# Patient Record
Sex: FEMALE | Race: WHITE | NOT HISPANIC OR LATINO | Employment: FULL TIME | URBAN - METROPOLITAN AREA
[De-identification: names, ages, dates, MRNs, and addresses within clinical notes are randomized per-mention and may not be internally consistent; named-entity substitution may affect disease eponyms.]

---

## 2023-04-04 LAB
ABO GROUP (TYPE) IN BLOOD: NORMAL
ANTIBODY SCREEN: NORMAL
ERYTHROCYTE DISTRIBUTION WIDTH (RATIO) BY AUTOMATED COUNT: 13.2 % (ref 11.5–14.5)
ERYTHROCYTE MEAN CORPUSCULAR HEMOGLOBIN CONCENTRATION (G/DL) BY AUTOMATED: 32.6 G/DL (ref 32–36)
ERYTHROCYTE MEAN CORPUSCULAR VOLUME (FL) BY AUTOMATED COUNT: 87 FL (ref 80–100)
ERYTHROCYTES (10*6/UL) IN BLOOD BY AUTOMATED COUNT: 4.19 X10E12/L (ref 4–5.2)
ESTIMATED AVERAGE GLUCOSE FOR HBA1C: 85 MG/DL
HEMATOCRIT (%) IN BLOOD BY AUTOMATED COUNT: 36.5 % (ref 36–46)
HEMOGLOBIN (G/DL) IN BLOOD: 11.9 G/DL (ref 12–16)
HEMOGLOBIN A1C/HEMOGLOBIN TOTAL IN BLOOD: 4.6 %
HEPATITIS B VIRUS SURFACE AG PRESENCE IN SERUM: NONREACTIVE
HEPATITIS C VIRUS AB PRESENCE IN SERUM: NONREACTIVE
HIV 1/ 2 AG/AB SCREEN: NONREACTIVE
LEUKOCYTES (10*3/UL) IN BLOOD BY AUTOMATED COUNT: 9.9 X10E9/L (ref 4.4–11.3)
PLATELETS (10*3/UL) IN BLOOD AUTOMATED COUNT: 220 X10E9/L (ref 150–450)
REFLEX ADDED, ANEMIA PANEL: ABNORMAL
RH FACTOR: NORMAL
RUBELLA VIRUS IGG AB: POSITIVE
SYPHILIS TOTAL AB: NONREACTIVE

## 2023-04-05 LAB
CHLAMYDIA TRACH., AMPLIFIED: NEGATIVE
N. GONORRHEA, AMPLIFIED: NEGATIVE

## 2023-04-06 LAB — URINE CULTURE: NORMAL

## 2023-07-26 LAB
ERYTHROCYTE DISTRIBUTION WIDTH (RATIO) BY AUTOMATED COUNT: 14.6 % (ref 11.5–14.5)
ERYTHROCYTE MEAN CORPUSCULAR HEMOGLOBIN CONCENTRATION (G/DL) BY AUTOMATED: 32.2 G/DL (ref 32–36)
ERYTHROCYTE MEAN CORPUSCULAR VOLUME (FL) BY AUTOMATED COUNT: 87 FL (ref 80–100)
ERYTHROCYTES (10*6/UL) IN BLOOD BY AUTOMATED COUNT: 4.13 X10E12/L (ref 4–5.2)
GLUCOSE, 1 HR SCREEN, PREG: 114 MG/DL
HEMATOCRIT (%) IN BLOOD BY AUTOMATED COUNT: 36 % (ref 36–46)
HEMOGLOBIN (G/DL) IN BLOOD: 11.6 G/DL (ref 12–16)
LEUKOCYTES (10*3/UL) IN BLOOD BY AUTOMATED COUNT: 7.1 X10E9/L (ref 4.4–11.3)
PLATELETS (10*3/UL) IN BLOOD AUTOMATED COUNT: 189 X10E9/L (ref 150–450)
REFLEX ADDED, ANEMIA PANEL: ABNORMAL

## 2023-09-15 LAB — GROUP B STREP SCREEN: NORMAL

## 2023-10-10 ENCOUNTER — PREP FOR PROCEDURE (OUTPATIENT)
Dept: OBSTETRICS AND GYNECOLOGY | Facility: HOSPITAL | Age: 33
End: 2023-10-10
Payer: COMMERCIAL

## 2023-10-17 ENCOUNTER — APPOINTMENT (OUTPATIENT)
Dept: OBSTETRICS AND GYNECOLOGY | Facility: HOSPITAL | Age: 33
End: 2023-10-17
Payer: COMMERCIAL

## 2023-10-17 ENCOUNTER — HOSPITAL ENCOUNTER (INPATIENT)
Facility: HOSPITAL | Age: 33
LOS: 2 days | Discharge: HOME | End: 2023-10-19
Attending: OBSTETRICS & GYNECOLOGY | Admitting: OBSTETRICS & GYNECOLOGY
Payer: COMMERCIAL

## 2023-10-17 PROBLEM — Z34.90 TERM PREGNANCY (HHS-HCC): Status: ACTIVE | Noted: 2023-10-17

## 2023-10-17 PROBLEM — E28.2 POLYCYSTIC OVARIAN SYNDROME: Chronic | Status: ACTIVE | Noted: 2023-10-17

## 2023-10-17 LAB
ABO GROUP (TYPE) IN BLOOD: NORMAL
ANTIBODY SCREEN: NORMAL
ERYTHROCYTE [DISTWIDTH] IN BLOOD BY AUTOMATED COUNT: 14.5 % (ref 11.5–14.5)
HCT VFR BLD AUTO: 36.6 % (ref 36–46)
HGB BLD-MCNC: 12.2 G/DL (ref 12–16)
MCH RBC QN AUTO: 28.8 PG (ref 26–34)
MCHC RBC AUTO-ENTMCNC: 33.3 G/DL (ref 32–36)
MCV RBC AUTO: 87 FL (ref 80–100)
NRBC BLD-RTO: 0 /100 WBCS (ref 0–0)
PLATELET # BLD AUTO: 167 X10*3/UL (ref 150–450)
PMV BLD AUTO: 13.2 FL (ref 7.5–11.5)
RBC # BLD AUTO: 4.23 X10*6/UL (ref 4–5.2)
RH FACTOR (ANTIGEN D): NORMAL
WBC # BLD AUTO: 10.8 X10*3/UL (ref 4.4–11.3)

## 2023-10-17 PROCEDURE — 36415 COLL VENOUS BLD VENIPUNCTURE: CPT | Performed by: OBSTETRICS & GYNECOLOGY

## 2023-10-17 PROCEDURE — 86900 BLOOD TYPING SEROLOGIC ABO: CPT | Performed by: OBSTETRICS & GYNECOLOGY

## 2023-10-17 PROCEDURE — 2500000001 HC RX 250 WO HCPCS SELF ADMINISTERED DRUGS (ALT 637 FOR MEDICARE OP): Performed by: OBSTETRICS & GYNECOLOGY

## 2023-10-17 PROCEDURE — 1120000001 HC OB PRIVATE ROOM DAILY

## 2023-10-17 PROCEDURE — 86780 TREPONEMA PALLIDUM: CPT | Mod: CMCLAB,GEALAB | Performed by: OBSTETRICS & GYNECOLOGY

## 2023-10-17 PROCEDURE — 85027 COMPLETE CBC AUTOMATED: CPT | Performed by: OBSTETRICS & GYNECOLOGY

## 2023-10-17 RX ORDER — SODIUM CHLORIDE, SODIUM LACTATE, POTASSIUM CHLORIDE, CALCIUM CHLORIDE 600; 310; 30; 20 MG/100ML; MG/100ML; MG/100ML; MG/100ML
125 INJECTION, SOLUTION INTRAVENOUS CONTINUOUS
Status: DISCONTINUED | OUTPATIENT
Start: 2023-10-17 | End: 2023-10-18

## 2023-10-17 RX ORDER — METOCLOPRAMIDE HYDROCHLORIDE 5 MG/ML
10 INJECTION INTRAMUSCULAR; INTRAVENOUS EVERY 6 HOURS PRN
Status: DISCONTINUED | OUTPATIENT
Start: 2023-10-17 | End: 2023-10-18

## 2023-10-17 RX ORDER — METOCLOPRAMIDE 10 MG/1
10 TABLET ORAL EVERY 6 HOURS PRN
Status: DISCONTINUED | OUTPATIENT
Start: 2023-10-17 | End: 2023-10-18

## 2023-10-17 RX ORDER — TRANEXAMIC ACID 100 MG/ML
1000 INJECTION, SOLUTION INTRAVENOUS ONCE AS NEEDED
Status: DISCONTINUED | OUTPATIENT
Start: 2023-10-17 | End: 2023-10-18

## 2023-10-17 RX ORDER — LOPERAMIDE HYDROCHLORIDE 2 MG/1
4 CAPSULE ORAL EVERY 2 HOUR PRN
Status: DISCONTINUED | OUTPATIENT
Start: 2023-10-17 | End: 2023-10-18

## 2023-10-17 RX ORDER — OXYTOCIN 10 [USP'U]/ML
10 INJECTION, SOLUTION INTRAMUSCULAR; INTRAVENOUS ONCE AS NEEDED
Status: DISCONTINUED | OUTPATIENT
Start: 2023-10-17 | End: 2023-10-18

## 2023-10-17 RX ORDER — CARBOPROST TROMETHAMINE 250 UG/ML
250 INJECTION, SOLUTION INTRAMUSCULAR ONCE AS NEEDED
Status: DISCONTINUED | OUTPATIENT
Start: 2023-10-17 | End: 2023-10-18

## 2023-10-17 RX ORDER — LIDOCAINE HYDROCHLORIDE 10 MG/ML
30 INJECTION INFILTRATION; PERINEURAL ONCE AS NEEDED
Status: DISCONTINUED | OUTPATIENT
Start: 2023-10-17 | End: 2023-10-18

## 2023-10-17 RX ORDER — HYDRALAZINE HYDROCHLORIDE 20 MG/ML
5 INJECTION INTRAMUSCULAR; INTRAVENOUS ONCE AS NEEDED
Status: DISCONTINUED | OUTPATIENT
Start: 2023-10-17 | End: 2023-10-18

## 2023-10-17 RX ORDER — OXYTOCIN/0.9 % SODIUM CHLORIDE 30/500 ML
60 PLASTIC BAG, INJECTION (ML) INTRAVENOUS ONCE AS NEEDED
Status: DISCONTINUED | OUTPATIENT
Start: 2023-10-17 | End: 2023-10-18

## 2023-10-17 RX ORDER — TERBUTALINE SULFATE 1 MG/ML
0.25 INJECTION SUBCUTANEOUS ONCE AS NEEDED
Status: DISCONTINUED | OUTPATIENT
Start: 2023-10-17 | End: 2023-10-18

## 2023-10-17 RX ORDER — OXYTOCIN/0.9 % SODIUM CHLORIDE 30/500 ML
2-30 PLASTIC BAG, INJECTION (ML) INTRAVENOUS CONTINUOUS
Status: DISCONTINUED | OUTPATIENT
Start: 2023-10-17 | End: 2023-10-18

## 2023-10-17 RX ORDER — NIFEDIPINE 10 MG/1
10 CAPSULE ORAL ONCE AS NEEDED
Status: DISCONTINUED | OUTPATIENT
Start: 2023-10-17 | End: 2023-10-18

## 2023-10-17 RX ORDER — MISOPROSTOL 200 UG/1
800 TABLET ORAL ONCE AS NEEDED
Status: DISCONTINUED | OUTPATIENT
Start: 2023-10-17 | End: 2023-10-18

## 2023-10-17 RX ORDER — LABETALOL HYDROCHLORIDE 5 MG/ML
20 INJECTION, SOLUTION INTRAVENOUS ONCE AS NEEDED
Status: DISCONTINUED | OUTPATIENT
Start: 2023-10-17 | End: 2023-10-18

## 2023-10-17 RX ORDER — ONDANSETRON 4 MG/1
4 TABLET, FILM COATED ORAL EVERY 6 HOURS PRN
Status: DISCONTINUED | OUTPATIENT
Start: 2023-10-17 | End: 2023-10-18

## 2023-10-17 RX ORDER — ONDANSETRON HYDROCHLORIDE 2 MG/ML
4 INJECTION, SOLUTION INTRAVENOUS EVERY 6 HOURS PRN
Status: DISCONTINUED | OUTPATIENT
Start: 2023-10-17 | End: 2023-10-18

## 2023-10-17 RX ORDER — METHYLERGONOVINE MALEATE 0.2 MG/ML
0.2 INJECTION INTRAVENOUS ONCE AS NEEDED
Status: DISCONTINUED | OUTPATIENT
Start: 2023-10-17 | End: 2023-10-18

## 2023-10-17 RX ADMIN — MISOPROSTOL 25 MCG: 100 TABLET ORAL at 23:03

## 2023-10-17 SDOH — HEALTH STABILITY: MENTAL HEALTH: WISH TO BE DEAD (PAST 1 MONTH): NO

## 2023-10-17 SDOH — HEALTH STABILITY: MENTAL HEALTH: NON-SPECIFIC ACTIVE SUICIDAL THOUGHTS (PAST 1 MONTH): NO

## 2023-10-17 SDOH — SOCIAL STABILITY: SOCIAL INSECURITY: DO YOU FEEL ANYONE HAS EXPLOITED OR TAKEN ADVANTAGE OF YOU FINANCIALLY OR OF YOUR PERSONAL PROPERTY?: NO

## 2023-10-17 SDOH — HEALTH STABILITY: MENTAL HEALTH: WERE YOU ABLE TO COMPLETE ALL THE BEHAVIORAL HEALTH SCREENINGS?: YES

## 2023-10-17 SDOH — SOCIAL STABILITY: SOCIAL INSECURITY: HAVE YOU HAD THOUGHTS OF HARMING ANYONE ELSE?: NO

## 2023-10-17 SDOH — HEALTH STABILITY: MENTAL HEALTH: SUICIDAL BEHAVIOR (LIFETIME): NO

## 2023-10-17 SDOH — HEALTH STABILITY: MENTAL HEALTH: STRENGTHS (MUST CHOOSE TWO): SUPPORT FROM FAMILY;INDEPENDENT LIVING

## 2023-10-17 SDOH — ECONOMIC STABILITY: HOUSING INSECURITY: DO YOU FEEL UNSAFE GOING BACK TO THE PLACE WHERE YOU ARE LIVING?: NO

## 2023-10-17 SDOH — SOCIAL STABILITY: SOCIAL INSECURITY: DOES ANYONE TRY TO KEEP YOU FROM HAVING/CONTACTING OTHER FRIENDS OR DOING THINGS OUTSIDE YOUR HOME?: NO

## 2023-10-17 SDOH — SOCIAL STABILITY: SOCIAL INSECURITY: ABUSE SCREEN: ADULT

## 2023-10-17 SDOH — SOCIAL STABILITY: SOCIAL INSECURITY: HAS ANYONE EVER THREATENED TO HURT YOUR FAMILY OR YOUR PETS?: NO

## 2023-10-17 SDOH — SOCIAL STABILITY: SOCIAL INSECURITY: ARE YOU OR HAVE YOU BEEN THREATENED OR ABUSED PHYSICALLY, EMOTIONALLY, OR SEXUALLY BY ANYONE?: NO

## 2023-10-17 SDOH — SOCIAL STABILITY: SOCIAL INSECURITY: PHYSICAL ABUSE: DENIES

## 2023-10-17 SDOH — SOCIAL STABILITY: SOCIAL INSECURITY: VERBAL ABUSE: DENIES

## 2023-10-17 SDOH — SOCIAL STABILITY: SOCIAL INSECURITY: ARE THERE ANY APPARENT SIGNS OF INJURIES/BEHAVIORS THAT COULD BE RELATED TO ABUSE/NEGLECT?: NO

## 2023-10-17 ASSESSMENT — PAIN SCALES - GENERAL
PAINLEVEL_OUTOF10: 0 - NO PAIN
PAINLEVEL_OUTOF10: 0 - NO PAIN

## 2023-10-17 ASSESSMENT — LIFESTYLE VARIABLES
HOW OFTEN DO YOU HAVE A DRINK CONTAINING ALCOHOL: NEVER
HOW MANY STANDARD DRINKS CONTAINING ALCOHOL DO YOU HAVE ON A TYPICAL DAY: PATIENT DOES NOT DRINK
HOW OFTEN DO YOU HAVE 6 OR MORE DRINKS ON ONE OCCASION: NEVER
SKIP TO QUESTIONS 9-10: 1
AUDIT-C TOTAL SCORE: 0
AUDIT-C TOTAL SCORE: 0

## 2023-10-17 ASSESSMENT — ACTIVITIES OF DAILY LIVING (ADL): LACK_OF_TRANSPORTATION: NO

## 2023-10-17 ASSESSMENT — PATIENT HEALTH QUESTIONNAIRE - PHQ9
SUM OF ALL RESPONSES TO PHQ9 QUESTIONS 1 & 2: 0
2. FEELING DOWN, DEPRESSED OR HOPELESS: NOT AT ALL
1. LITTLE INTEREST OR PLEASURE IN DOING THINGS: NOT AT ALL

## 2023-10-18 ENCOUNTER — ANESTHESIA (OUTPATIENT)
Dept: ANESTHESIOLOGY | Facility: HOSPITAL | Age: 33
End: 2023-10-18
Payer: COMMERCIAL

## 2023-10-18 ENCOUNTER — ANESTHESIA EVENT (OUTPATIENT)
Dept: ANESTHESIOLOGY | Facility: HOSPITAL | Age: 33
End: 2023-10-18
Payer: COMMERCIAL

## 2023-10-18 PROBLEM — Z34.90 TERM PREGNANCY (HHS-HCC): Status: RESOLVED | Noted: 2023-10-17 | Resolved: 2023-10-18

## 2023-10-18 LAB — T PALLIDUM AB SER QL: NONREACTIVE

## 2023-10-18 PROCEDURE — 3700000001 HC GENERAL ANESTHESIA TIME - INITIAL BASE CHARGE: Performed by: NURSE ANESTHETIST, CERTIFIED REGISTERED

## 2023-10-18 PROCEDURE — 2500000001 HC RX 250 WO HCPCS SELF ADMINISTERED DRUGS (ALT 637 FOR MEDICARE OP): Performed by: OBSTETRICS & GYNECOLOGY

## 2023-10-18 PROCEDURE — 59409 OBSTETRICAL CARE: CPT | Performed by: OBSTETRICS & GYNECOLOGY

## 2023-10-18 PROCEDURE — 2580000001 HC RX 258 IV SOLUTIONS: Performed by: OBSTETRICS & GYNECOLOGY

## 2023-10-18 PROCEDURE — 3E0P7VZ INTRODUCTION OF HORMONE INTO FEMALE REPRODUCTIVE, VIA NATURAL OR ARTIFICIAL OPENING: ICD-10-PCS | Performed by: OBSTETRICS & GYNECOLOGY

## 2023-10-18 PROCEDURE — 3700000002 HC GENERAL ANESTHESIA TIME - EACH INCREMENTAL 1 MINUTE: Performed by: NURSE ANESTHETIST, CERTIFIED REGISTERED

## 2023-10-18 PROCEDURE — 3E033VJ INTRODUCTION OF OTHER HORMONE INTO PERIPHERAL VEIN, PERCUTANEOUS APPROACH: ICD-10-PCS | Performed by: OBSTETRICS & GYNECOLOGY

## 2023-10-18 PROCEDURE — 1120000001 HC OB PRIVATE ROOM DAILY

## 2023-10-18 PROCEDURE — 2500000004 HC RX 250 GENERAL PHARMACY W/ HCPCS (ALT 636 FOR OP/ED): Performed by: NURSE ANESTHETIST, CERTIFIED REGISTERED

## 2023-10-18 PROCEDURE — A59400 PR FULL ROUT OBSTE CARE,VAGINAL DELIV: Performed by: NURSE ANESTHETIST, CERTIFIED REGISTERED

## 2023-10-18 RX ORDER — ONDANSETRON HYDROCHLORIDE 2 MG/ML
4 INJECTION, SOLUTION INTRAVENOUS EVERY 6 HOURS PRN
Status: DISCONTINUED | OUTPATIENT
Start: 2023-10-18 | End: 2023-10-19 | Stop reason: HOSPADM

## 2023-10-18 RX ORDER — FENTANYL/ROPIVACAINE/NS/PF 2MCG/ML-.2
0-25 PLASTIC BAG, INJECTION (ML) INJECTION CONTINUOUS
Status: DISCONTINUED | OUTPATIENT
Start: 2023-10-18 | End: 2023-10-18

## 2023-10-18 RX ORDER — OXYTOCIN 10 [USP'U]/ML
10 INJECTION, SOLUTION INTRAMUSCULAR; INTRAVENOUS ONCE AS NEEDED
Status: DISCONTINUED | OUTPATIENT
Start: 2023-10-18 | End: 2023-10-19 | Stop reason: HOSPADM

## 2023-10-18 RX ORDER — LABETALOL HYDROCHLORIDE 5 MG/ML
20 INJECTION, SOLUTION INTRAVENOUS ONCE AS NEEDED
Status: DISCONTINUED | OUTPATIENT
Start: 2023-10-18 | End: 2023-10-19 | Stop reason: HOSPADM

## 2023-10-18 RX ORDER — LIDOCAINE 560 MG/1
1 PATCH PERCUTANEOUS; TOPICAL; TRANSDERMAL
Status: DISCONTINUED | OUTPATIENT
Start: 2023-10-18 | End: 2023-10-19 | Stop reason: HOSPADM

## 2023-10-18 RX ORDER — CARBOPROST TROMETHAMINE 250 UG/ML
250 INJECTION, SOLUTION INTRAMUSCULAR ONCE AS NEEDED
Status: DISCONTINUED | OUTPATIENT
Start: 2023-10-18 | End: 2023-10-19 | Stop reason: HOSPADM

## 2023-10-18 RX ORDER — ONDANSETRON 4 MG/1
4 TABLET, FILM COATED ORAL EVERY 6 HOURS PRN
Status: DISCONTINUED | OUTPATIENT
Start: 2023-10-18 | End: 2023-10-19 | Stop reason: HOSPADM

## 2023-10-18 RX ORDER — FENTANYL/ROPIVACAINE/NS/PF 2MCG/ML-.2
PLASTIC BAG, INJECTION (ML) INJECTION
Status: COMPLETED
Start: 2023-10-18 | End: 2023-10-18

## 2023-10-18 RX ORDER — DIPHENHYDRAMINE HYDROCHLORIDE 50 MG/ML
25 INJECTION INTRAMUSCULAR; INTRAVENOUS EVERY 6 HOURS PRN
Status: DISCONTINUED | OUTPATIENT
Start: 2023-10-18 | End: 2023-10-19 | Stop reason: HOSPADM

## 2023-10-18 RX ORDER — POLYETHYLENE GLYCOL 3350 17 G/17G
17 POWDER, FOR SOLUTION ORAL 2 TIMES DAILY PRN
Status: DISCONTINUED | OUTPATIENT
Start: 2023-10-18 | End: 2023-10-19 | Stop reason: HOSPADM

## 2023-10-18 RX ORDER — METHYLERGONOVINE MALEATE 0.2 MG/ML
0.2 INJECTION INTRAVENOUS ONCE AS NEEDED
Status: DISCONTINUED | OUTPATIENT
Start: 2023-10-18 | End: 2023-10-19 | Stop reason: HOSPADM

## 2023-10-18 RX ORDER — OXYTOCIN/0.9 % SODIUM CHLORIDE 30/500 ML
60 PLASTIC BAG, INJECTION (ML) INTRAVENOUS ONCE AS NEEDED
Status: DISCONTINUED | OUTPATIENT
Start: 2023-10-18 | End: 2023-10-19 | Stop reason: HOSPADM

## 2023-10-18 RX ORDER — NIFEDIPINE 10 MG/1
10 CAPSULE ORAL ONCE AS NEEDED
Status: DISCONTINUED | OUTPATIENT
Start: 2023-10-18 | End: 2023-10-19 | Stop reason: HOSPADM

## 2023-10-18 RX ORDER — ACETAMINOPHEN 325 MG/1
975 TABLET ORAL EVERY 6 HOURS SCHEDULED
Status: DISCONTINUED | OUTPATIENT
Start: 2023-10-18 | End: 2023-10-19 | Stop reason: HOSPADM

## 2023-10-18 RX ORDER — IBUPROFEN 600 MG/1
600 TABLET ORAL EVERY 6 HOURS SCHEDULED
Status: DISCONTINUED | OUTPATIENT
Start: 2023-10-18 | End: 2023-10-19 | Stop reason: HOSPADM

## 2023-10-18 RX ORDER — MAGNESIUM HYDROXIDE 2400 MG/10ML
10 SUSPENSION ORAL
Status: DISCONTINUED | OUTPATIENT
Start: 2023-10-18 | End: 2023-10-19 | Stop reason: HOSPADM

## 2023-10-18 RX ORDER — LOPERAMIDE HYDROCHLORIDE 2 MG/1
4 CAPSULE ORAL EVERY 2 HOUR PRN
Status: DISCONTINUED | OUTPATIENT
Start: 2023-10-18 | End: 2023-10-19 | Stop reason: HOSPADM

## 2023-10-18 RX ORDER — BISACODYL 10 MG/1
10 SUPPOSITORY RECTAL DAILY PRN
Status: DISCONTINUED | OUTPATIENT
Start: 2023-10-18 | End: 2023-10-19 | Stop reason: HOSPADM

## 2023-10-18 RX ORDER — SIMETHICONE 80 MG
80 TABLET,CHEWABLE ORAL 4 TIMES DAILY PRN
Status: DISCONTINUED | OUTPATIENT
Start: 2023-10-18 | End: 2023-10-19 | Stop reason: HOSPADM

## 2023-10-18 RX ORDER — MISOPROSTOL 200 UG/1
800 TABLET ORAL ONCE AS NEEDED
Status: DISCONTINUED | OUTPATIENT
Start: 2023-10-18 | End: 2023-10-19 | Stop reason: HOSPADM

## 2023-10-18 RX ORDER — TRANEXAMIC ACID 100 MG/ML
1000 INJECTION, SOLUTION INTRAVENOUS ONCE AS NEEDED
Status: DISCONTINUED | OUTPATIENT
Start: 2023-10-18 | End: 2023-10-19 | Stop reason: HOSPADM

## 2023-10-18 RX ORDER — HYDRALAZINE HYDROCHLORIDE 20 MG/ML
5 INJECTION INTRAMUSCULAR; INTRAVENOUS ONCE AS NEEDED
Status: DISCONTINUED | OUTPATIENT
Start: 2023-10-18 | End: 2023-10-19 | Stop reason: HOSPADM

## 2023-10-18 RX ORDER — DIPHENHYDRAMINE HCL 25 MG
25 CAPSULE ORAL EVERY 6 HOURS PRN
Status: DISCONTINUED | OUTPATIENT
Start: 2023-10-18 | End: 2023-10-19 | Stop reason: HOSPADM

## 2023-10-18 RX ADMIN — ACETAMINOPHEN 975 MG: 325 TABLET ORAL at 21:40

## 2023-10-18 RX ADMIN — SODIUM CHLORIDE, POTASSIUM CHLORIDE, SODIUM LACTATE AND CALCIUM CHLORIDE 125 ML/HR: 600; 310; 30; 20 INJECTION, SOLUTION INTRAVENOUS at 04:02

## 2023-10-18 RX ADMIN — MISOPROSTOL 25 MCG: 100 TABLET ORAL at 02:05

## 2023-10-18 RX ADMIN — IBUPROFEN 600 MG: 600 TABLET, FILM COATED ORAL at 21:39

## 2023-10-18 RX ADMIN — Medication 5 ML/HR: at 04:57

## 2023-10-18 RX ADMIN — IBUPROFEN 600 MG: 600 TABLET, FILM COATED ORAL at 15:52

## 2023-10-18 RX ADMIN — ACETAMINOPHEN 975 MG: 325 TABLET ORAL at 15:51

## 2023-10-18 ASSESSMENT — PAIN SCALES - GENERAL
PAINLEVEL_OUTOF10: 0 - NO PAIN
PAINLEVEL_OUTOF10: 0 - NO PAIN
PAINLEVEL_OUTOF10: 3
PAINLEVEL_OUTOF10: 1
PAINLEVEL_OUTOF10: 6
PAINLEVEL_OUTOF10: 10 - WORST POSSIBLE PAIN
PAINLEVEL_OUTOF10: 0 - NO PAIN

## 2023-10-18 NOTE — H&P
Obstetrical Admission History and Physical     Una Alfaro is a 33 y.o.  at 40w5d. HERLINDA: 10/12/2023, by Ultrasound. Estimated fetal weight: 8.8 lbs. She has had prenatal care with Dr. Terry at [facility] .  Pregnancy notable for  -declined genetic screening  -obesity, normal A1C and gtt  -h/o PCOS  -dating by 12 wk US, off of LMP (nursing at time of LMP, irregular cycles)    Chief Complaint: Scheduled Induction    Assessment/Plan    34 yo  at 40.5 wks presents for scheduled IOL  -admit to L&D  -CEFM  -IVF, T&S, CBC  -cytotec induction, vaginal followed by oral and pitocin if needed  -epidural when desires  -GBS neg    Principal Problem:    Term pregnancy  Active Problems:    Obesity complicating childbirth      Pregnancy Problems (from 10/17/23 to present)       Problem Noted Resolved    Term pregnancy 10/17/2023 by Rivera Terry MD No    Priority:  Medium            Options for delivery have been discussed with the patient and she elects for an induction of labor.  Cervical ripening with cytotec, cervidil, other prostaglandin agents has been discussed.  Induction of labor with pitocin, amniotomy, cytotec, and cervical balloon have been discussed in detail. The risks, benefits, complications, alternatives, expected outcomes, potential problems during recuperation and recovery, and the risks of not performing the procedure were discussed with the patient. The patient stated understanding that the risks of delivery include, but are not limited to: death; reaction to medications; injury to bowel, bladder, ureters, uterus, cervix, vagina, and other pelvic and abdominal structures, infection; blood loss and possible need for transfusion; and potential need for surgery, including hysterectomy. The risks of injury to the infant during delivery were also discussed. All questions were answered. There was concurrence with the planned procedure, and the patient wanted to proceed.    Admit to inpatient  status. I anticipate that this patient will require a stay exceeding at least 2 midnights for delivery and postpartum.  Induction of labor.  Management of pregnancy complications, as indicated.    Subjective   Good fetal movement. Denies vaginal bleeding., Denies contractions., Denies leaking of fluid.       Reason for Induction of Labor:  Pregnancy at 39 weeks or greater for induction     Obstetrical History   OB History    Para Term  AB Living   4 2 2 0 1 2   SAB IAB Ectopic Multiple Live Births           2      # Outcome Date GA Lbr Dragan/2nd Weight Sex Delivery Anes PTL Lv   4 Current            3 Term  40w0d  3856 g F Vag-Spont EPI N AGAPITO   2 AB 2020 7w0d          1 Term 2019 41w0d  3856 g M Vag-Spont EPI N AGAPITO       Past Medical History  History reviewed. No pertinent past medical history.     Past Surgical History   History reviewed. No pertinent surgical history.    Social History  Social History     Tobacco Use    Smoking status: Never    Smokeless tobacco: Never   Substance Use Topics    Alcohol use: Not Currently     Substance and Sexual Activity   Drug Use Never       Allergies  Patient has no known allergies.     Medications  No medications prior to admission.       Objective    Last Vitals  Temp Pulse Resp BP MAP O2 Sat   36 °C (96.8 °F) 79 18 114/73 89 mmHg 97 %     Physical Examination  GENERAL: Examination reveals a well developed, well nourished, gravid female in no acute distress. She is alert and cooperative.  ABDOMEN: soft, gravid, nontender, nondistended, no abnormal masses, no epigastric pain  FHR is 140s , with  moderate variability and a cat. 1  tracing.    [place] reading:  irregular contractions  The fetus is in a vertex presentation, determined by vaginal exam  Current Estimated Fetal Weight 8.8 lbs established by Leopold's maneuver  VAGINA: normal appearing vagina with normal color and discharge and no lesions noted  CERVIX:   cm dilated,   % effaced,   station; MEMBRANES are     EXTREMITIES: no redness or tenderness in the calves or thighs, no edema    Lab Review  Labs in chart were reviewed.

## 2023-10-18 NOTE — L&D DELIVERY NOTE
OB Delivery Note  10/18/2023  Una Alfaro  33 y.o.     Gestational Age: 40w6d  /Para:   Estimated Blood Loss:   Delivery Blood Loss  10/17/23 1702 - 10/18/23 1939      Quantitative Blood Loss - Vaginal (mL) Hospital Encounter 105 mL    Total  105 mL          Quantitative Blood Loss: Admission to Discharge: 105 mL (10/17/2023  8:55 PM - 10/18/2023  7:39 PM)    Jennifer Alfaro [[id number]]      Labor Events    Sac identifier: Sac 1  Rupture date/time: 10/18/2023 0408  Rupture type: Spontaneous  Fluid color: Clear  Fluid odor: None  Labor type: Induced Onset of Labor  Labor allowed to proceed with plans for an attempted vaginal birth?: Yes  Induction: Misoprostol  First cervical ripening date/time: 10/17/2023 2303  Induction indications: Elective  Complications: None       Labor Event Times    Dilation complete date/time: 10/18/2023 0500       Labor Length    2nd stage: 0h 02m  3rd stage: 0h 05m       Placenta    Placenta delivery date/time: 10/18/2023 0507  Placenta removal: Spontaneous  Placenta appearance: Intact  Placenta disposition: discarded       Cord    Vessels: 3 vessels  Complications: None  Delayed cord clamping?: Yes  Cord blood disposition: Lab  Gases sent?: No  Stem cell collection (by provider): No       Lacerations    Episiotomy: None  Vaginal laceration?: Yes  Vaginal laceration repaired?: No  Repair suture: None       Anesthesia    Method: Epidural       Operative Delivery    Forceps attempted?: No  Vacuum extractor attempted?: No       Shoulder Dystocia    Shoulder dystocia present?: No       [place] Delivery    Time head delivered: 10/18/2023 05:02:00  Birth date/time: 10/18/2023 05:02:00  Delivery type: Vaginal, Spontaneous  Complications: None       Resuscitation    Method: Suctioning, Tactile stimulation       Apgars    Living status: Living  Apgar Component Scores:  1 min.:  5 min.:  10 min.:  15 min.:  20 min.:    Skin color:  0  1       Heart rate:  2  2       Reflex  irritability:  2  2       Muscle tone:  2  2       Respiratory effort:  2  2       Total:  8  9       Apgars assigned by: SANDY ANNA RN       Delivery Providers    Delivering clinician: Rivera Terry MD   Provider Role    Erika Myers RN Delivery Nurse    Sandy Anna, RN Nursery Nurse     Resident                 Rivera Terry MD

## 2023-10-18 NOTE — ANESTHESIA PREPROCEDURE EVALUATION
Patient: Una Alfaro    Evaluation Method: In-person visit    Procedure Information    Date: 10/18/23  Procedure: Labor Analgesia         Relevant Problems   Endocrine   (+) Obesity complicating childbirth (Resolved)       Clinical information reviewed:   Tobacco  Allergies  Meds  Problems  Med Hx  Surg Hx   Fam Hx  Soc   Hx        NPO Detail:  No data recorded     OB/GYN     Physical Exam    Airway  Mallampati: III  TM distance: >3 FB  Neck ROM: full     Cardiovascular - normal exam     Dental    Pulmonary - normal exam     Abdominal - normal exam             Anesthesia Plan    ASA 2     The patient is not a current smoker.  Patient was not previously instructed to abstain from smoking on day of procedure.  Patient did not smoke on day of procedure.    Anesthetic plan and risks discussed with patient and spouse.  Use of blood products discussed with patient and spouse who consented to blood products.

## 2023-10-18 NOTE — CARE PLAN
The patient's goals for the shift include  adequate breastfeeding    The clinical goals for the shift include recovery without complication, normal post partum care

## 2023-10-18 NOTE — PROGRESS NOTES
"Una Alfaro is a 33 y.o. female on day 0 of admission presenting with Term pregnancy.    Subjective   CTPR to start induction of labor. Patient doing well, no change since last visit in office.  Denies painful contractions, bleeding, leaking fluid.  Reports good fetal movement  Please see H&P by Dr. Terry earlier this evening       Objective     Physical Exam  Constitutional:       Appearance: Normal appearance.   Pulmonary:      Effort: Pulmonary effort is normal.   Psychiatric:         Mood and Affect: Mood normal.   CVX 2.5/70/-2  Presentation: Vertex  Position: OA  Method: Manual  OB Examiner: DO Casimiro     at     Last Recorded Vitals  Blood pressure 114/73, pulse 79, temperature 36.6 °C (97.9 °F), temperature source Temporal, resp. rate 18, height 1.626 m (5' 4\"), weight 106 kg (233 lb 7.5 oz), last menstrual period 01/14/2023, SpO2 97 %.  Intake/Output last 3 Shifts:  No intake/output data recorded.    Relevant Results    Assessment/Plan   Principal Problem:    Term pregnancy  Active Problems:    Obesity complicating childbirth    Admit to L&D  Cytotec followed by Pitocin per protocol. Vaginal Cytotec placed during exam  FHT Cat I  GBS neg       I spent 15 minutes in the professional and overall care of this patient.      Ayesha Kirkpatrick DO      "

## 2023-10-18 NOTE — LACTATION NOTE
This note was copied from a baby's chart.  [id number] Consultant Note  Lactation Consultation  Reason for Consult: Initial assessment  Consultant Name: TAMI Butler    Maternal Information  Has mother  before?: Yes  How long did the mother previously breastfeed?: 1 year for each child  Previous Maternal Breastfeeding Challenges: None  Infant to breast within first 2 hours of birth?: Yes  Exclusive Pump and Bottle Feed: No    Maternal Assessment       Infant Assessment       Feeding Assessment  Unable to assess infant feeding at this time: Maternal request    LATCH TOOL       Breast Pump       Other OB Lactation Tools       Patient Follow-up       Other OB Lactation Documentation       Recommendations/Summary   experienced breastfeeding mother and infant. Mother states that she has breast fed each of her children for one year a piece and plans to do the same for this child. Mother denies any breast changes with this pregnancy or breast augmentation/reduction surgeries. Mother has a breast pump at home, but it was from her last pregnancy. Mother desires a new breast pump if insurance will cover it. Paperwork submitted to [employer].  Mother states that infant has been feeding very well so far. Mother describes infant's latch as deep and comfortable and denies needing any lactation assistance at this time. LC reviewed normal feeding patterns, normal first 24 hour behavior and feeding cues. Reviewed feeding infant every three hours or sooner with cues. Reviewed wet and soiled diapers. Mother to call LC if she has any questions regarding latching or positioning. See education tab for detailed list of topics disscussed.    Ongoing assistance offered at this time. Opportunity for questions from parents. All questions answered at this time.

## 2023-10-18 NOTE — DISCHARGE SUMMARY
Discharge Diagnosis  Term pregnancy    Issues Requiring Follow-Up  Routine postpartum care    Test Results Pending At Discharge  Pending Labs       No current pending labs.            Hospital Course  Pt. Presented to L&D unit and was admitted for and IOL  Uncomplicated   Uncomplicated PP course    Pertinent Physical Exam At Time of Discharge  Physical Exam  Physical Exam  Constitutional:       General: She is not in acute distress.     Appearance: Normal appearance.   Pulmonary:      Effort: Pulmonary effort is normal.   Abdominal:      General: Bowel sounds are normal.      Palpations: Abdomen is soft.      Uterus firm, below U  Musculoskeletal:         General: Normal range of motion.      lower legs: No edema.   Neurological:      Mental Status: She is alert.   Psychiatric:         Mood and Affect: Mood normal.    Home Medications     Medication List      CONTINUE taking these medications     prenatal vit,calc76-iron-folic 29 mg iron- 1 mg tablet; Commonly known   as: Prenatabs Rx       Outpatient Follow-Up  4 wks postpartum visit    Rivera Terry MD

## 2023-10-18 NOTE — ANESTHESIA PROCEDURE NOTES
Epidural Block    Patient location during procedure: OB  Start time: 10/18/2023 5:54 AM  End time: 10/18/2023 5:56 AM  Reason for block: labor analgesia  Staffing  Performed: CRNA   Authorized by: WALT Paiz    Performed by: WALT Paiz    Preanesthetic Checklist  Completed: patient identified, IV checked, risks and benefits discussed, surgical consent, pre-op evaluation, timeout performed and sterile techniques followed  Block Timeout  RN/Licensed healthcare professional reads aloud to the Anesthesia provider and entire team: Patient identity, procedure with side and site, patient position, and as applicable the availability of implants/special equipment/special requirements.  Patient on coagulant treatment: no  Timeout performed at: 10/18/2023 5:53 AM  Block Placement  Patient position: sitting  Prep: ChloraPrep  Sterility prep: cap, gloves and mask  Sedation level: no sedation  Patient monitoring: blood pressure, continuous pulse oximetry and heart rate  Approach: midline  Local numbing: lidocaine 1% to skin and subcutaneous tissues  Vertebral space: lumbar  Epidural  Loss of resistance technique: air  Guidance: landmark technique        Needle  Needle type: Tuohy   Needle gauge: 17  Needle length: 8.9cm  Needle insertion depth: 8 cm  Catheter type: multi-orifice  Catheter size: 19 G  Catheter at skin depth: 16 cm  Catheter securement method: clear occlusive dressing    Test dose: lidocaine 1.5% with epinephrine 1-to-200,000  Test dose given at 10/18/2023 5:56 AM  Test dose: lidocaine 1.5% with epinephrine 1-to-200,000  Test dose result: no positive test dose            Assessment bilateral  Block outcome: patient comfortable  Number of attempts: 1  Events: no positive test dose  Procedure assessment: patient tolerated procedure well with no immediate complications  Additional Notes  Pt delivered after placement

## 2023-10-19 VITALS
HEIGHT: 64 IN | WEIGHT: 233.47 LBS | BODY MASS INDEX: 39.86 KG/M2 | HEART RATE: 76 BPM | SYSTOLIC BLOOD PRESSURE: 121 MMHG | OXYGEN SATURATION: 97 % | RESPIRATION RATE: 16 BRPM | TEMPERATURE: 96.4 F | DIASTOLIC BLOOD PRESSURE: 57 MMHG

## 2023-10-19 PROCEDURE — 2500000001 HC RX 250 WO HCPCS SELF ADMINISTERED DRUGS (ALT 637 FOR MEDICARE OP): Performed by: OBSTETRICS & GYNECOLOGY

## 2023-10-19 RX ADMIN — IBUPROFEN 600 MG: 600 TABLET, FILM COATED ORAL at 10:51

## 2023-10-19 RX ADMIN — ACETAMINOPHEN 975 MG: 325 TABLET ORAL at 04:20

## 2023-10-19 RX ADMIN — IBUPROFEN 600 MG: 600 TABLET, FILM COATED ORAL at 04:19

## 2023-10-19 RX ADMIN — ACETAMINOPHEN 975 MG: 325 TABLET ORAL at 10:51

## 2023-10-19 ASSESSMENT — PAIN SCALES - GENERAL
PAINLEVEL_OUTOF10: 3
PAINLEVEL_OUTOF10: 0 - NO PAIN

## 2023-10-19 NOTE — LACTATION NOTE
This note was copied from a baby's chart.  Lactation Consultant Note  Lactation Consultation  Reason for Consult: Follow-up assessment  Consultant Name: JUANA Krishna RN, CBS    Maternal Information       Maternal Assessment  Breast Assessment: Large, Symmetrical, Pendulous, Soft, Warm, Compressible  Nipple Assessment: Intact, Erect, Large diameter  [place] Assessment: Normal    Infant Assessment  Infant Behavior: Sleepy, Readiness to feed, Feeding cues observed, Rooting response    Feeding Assessment  Nutrition Source: Breastmilk  Feeding Method: Nursing at the breast  Feeding Position: Cradle, Breast sandwich, Cross - cradle, Skin to skin, Nipple to nose, Mother demonstrates good positioning, Mother needs assistance with latch/positioning, Misalignment of baby's head, trunk, and hips, Baby's head too high over breast  Suck/Feeding: Sustained, Baby led rhythmically, Coordinated suck/swallow/breathe, Audible swallowing  Latch Assessment: Deep latch obtained, Mouth not open wide enough, Instructed on deep latch, Latch achieved, Comfortable with no pain, Bursts of sucking, swallowing, and rest, Comfortable latch, Eagerly grasped on to latch, Frequent audible swallows, Optimal angle of mouth opening, Sucking and swallowing, Flanged lips, Minimal assistance is needed, Sucks with long jaw movement, Chin moves in rhythmic motion    LATCH TOOL  Latch: Grasps breast, tongue down, lips flanged, rhythmic sucking  Audible Swallowing: Spontaneous and intermittent (24 hours old)  Type of Nipple: Everted (After stimulation)  Comfort (Breast/Nipple): Soft/non-tender  Hold (Positioning): Minimal assist, teach one side, mother does other, staff holds  LATCH Score: 9    Breast Pump       Other OB Lactation Tools       Patient Follow-up  Inpatient Lactation Follow-up Needed :  (as needed)  Outpatient Lactation Follow-up: Recommended    Other OB Lactation Documentation       Recommendations/Summary  LC called to bedside per mother's  request to assess latch. Mother previously concerned that  is not latching deep enough.  swaddled and showing feeding cues. LC encouraged mother to unswaddle  at this time and latch  as she has been and LC to assist as needed. Mother unswaddling and latching  to left breast in cradle hold with breast shaping.  high at the breast but able to obtain a deep latch. [place] eager to latch and deep latch obtained but  appears to not have mouth open at a wide enough angle. LC offered to assist, mother agreeable. LC unlatched  with a gloved finger. LC reviewed alignment of belly to belly and nipple to nose and tucking  close. LC then had mother switch from cradle hold to cross cradle with breast shaping. LC then reviewed with mother to brush her nipple to 's upper lip and wait for  to open mouth at a wide angle before latching. Mother able to return demonstrate and LC assisted mother to bring  to the breast at that time to obtain a deep latch. Deep latch quickly obtained with lips flanged and active sucking and swallowing. LC then assisted mother to adjust  slightly lower at the breast to better align . Mother pleased that this latch feels very comfortable and that  is very active at the breast with frequent sucks and swallows. Reviewed comfortable positioning for mother at this time as mother is concerned about 's nostrils being blocked by breast tissue. Reviewed hand placement and positioning and starting initial latch in cross cradle with breast shaping and then switching to cradle hold once  is latched if this is more comfortable. Mother switching to cradle hold at this time but then switching to cross cradle as she states she feels like she can position more efficiently. Again, mother pleased with this latch.  continuing to feed at this time. Encouraged mother to allow  to continue  feeding at first breast until  appears satiated and then offering the second breast. Encouraged mother to call LC should she need assistance latching  to opposite breast or any other breastfeeding assistance prior to discharge. Mother states understanding. Mother denies further questions or concerns at this time..

## 2023-10-19 NOTE — PROGRESS NOTES
"Una Alfaro is a 33 y.o. female on day 2 of admission presenting with Term pregnancy.    Subjective   Pt. Feeling well, denies complaints.  Bleeding light.  Nursing without difficulty       Objective     Physical Exam  Physical Exam  Constitutional:       General: She is not in acute distress.     Appearance: Normal appearance.   Pulmonary:      Effort: Pulmonary effort is normal.   Abdominal:      General: Bowel sounds are normal.      Palpations: Abdomen is soft.      Uterus firm, below U  Musculoskeletal:         General: Normal range of motion.      No edema.   Neurological:      Mental Status: She is alert.   Psychiatric:         Mood and Affect: Mood normal.    Last Recorded Vitals  Blood pressure 121/57, pulse 76, temperature (!) 35.8 °C (96.4 °F), resp. rate 16, height 1.626 m (5' 4\"), weight 106 kg (233 lb 7.5 oz), last menstrual period 2023, SpO2 97 %, currently breastfeeding.  Intake/Output last 3 Shifts:  I/O last 3 completed shifts:  In: 433.3 (4.1 mL/kg) [I.V.:433.3 (4.1 mL/kg)]  Out: 105 (1 mL/kg) [Blood:105]  Weight: 105.9 kg     Relevant Results      Assessment/Plan   Active Problems:  There are no active Hospital Problems.    Ppd1 s/p   Doing well, discharge home today.  Instructions reviewed     Rivera Terry MD      "

## 2023-10-19 NOTE — CARE PLAN
The patient's goals for the shift include  discharge review and vital signs stable at time of discharge.    The clinical goals for the shift include patient will have a stable postpartum period this shift

## 2023-10-19 NOTE — LACTATION NOTE
This note was copied from a baby's chart.  Lactation Consultant Note  Lactation Consultation  Reason for Consult: Initial assessment, Follow-up assessment  Consultant Name: JUANA Krishna RN, CBS    Maternal Information  Has mother  before?: Yes  How long did the mother previously breastfeed?: 1 year  Previous Maternal Breastfeeding Challenges: Other (Comment) (elevated weight loss in )  Exclusive Pump and Bottle Feed: No    Maternal Assessment  Breast Assessment: Other (Comment) (Did not assess at this time.)  Nipple Assessment: Intact, Other (Comment) (intact per mother)    Infant Assessment  Infant Behavior: Other (Comment) ([place] in nursery for circumcision.)  40.6 weeks, approximately 28 HOL, 2 voids/2 stools in last 24 hours, -5.01% weight loss @19 HOL    Feeding Assessment  Nutrition Source: Breastmilk  Feeding Method: Nursing at the breast  Unable to assess infant feeding at this time: Infant not available due to procedure    LATCH TOOL       Breast Pump  Pump: None    Other OB Lactation Tools       Patient Follow-up  Inpatient Lactation Follow-up Needed : Yes (encouraged to call LC to assess latch)  Outpatient Lactation Follow-up: Recommended  Lactation Professional - OK to Discharge: Yes    Other OB Lactation Documentation       Recommendations/Summary  32 y/o  experienced breastfeeding mother with vaginal delivery of  boy approximately 28 hours ago. Mother plans to breastfeed this  for as long as possible. Mother  her other two children for 1 year each. Mother states both of her other children had elevated weight loss in the first 2 weeks of life but she never had to supplement. Mother denies history of breast surgery. Breast pump request sent to [employer], awaiting approval.     LC to bedside to assess breastfeeding progress and review discharge education. [place] currently in the nursery for circumcision. Mother states she believes breastfeeding is going well  and states  has started to cluster feed. Mother denies feeling dong today but states her milk usually comes in on the third day. Mother denies pain or breakdown with latching but states she does not feel that  is getting a deep enough latch. Reviewed signs of a deep and comfortable latch and encouraged mother to call LC for next feed to assess latch and review positioning. Mother agreeable. Reviewed importance of latching  with any feeding cues and a minimum of 8 feeds per day or more with feeding cues. Mother states understanding.     Discharge education reviewed at this time. Reviewed benefits of breastfeeding, milk production, feeding cues and waking techniques. Reviewed signs of a deep and comfortable latch and how to tell  is eating adequately. Reviewed adequate intake and output. Reviewed cluster feeding and frequency of feeds. Reviewed when to begin pumping and cleaning of pump parts. Reviewed nipple care and how to prevent and treat engorgement, clogged ducts and mastitis. Mother agreeable to call LC for next feed. Ongoing assistance offered prior to discharge. Mother denies questions or concerns at this time.     [id number] Message received from [employer] for approval of mother's Medela breast pump. Pump distributed to mother.

## 2023-10-21 SDOH — HEALTH STABILITY: MENTAL HEALTH: CURRENT SMOKER: 0

## 2023-10-21 NOTE — ANESTHESIA PROCEDURE NOTES
Epidural Block    Patient location during procedure: OB  Reason for block: labor analgesia  Staffing  Performed: CRNA   Authorized by: WALT Paiz    Performed by: WALT Paiz    Preanesthetic Checklist  Completed: patient identified, IV checked, risks and benefits discussed, surgical consent, pre-op evaluation, timeout performed and sterile techniques followed  Block Timeout  RN/Licensed healthcare professional reads aloud to the Anesthesia provider and entire team: Patient identity, procedure with side and site, patient position, and as applicable the availability of implants/special equipment/special requirements.  Patient on coagulant treatment: no  Timeout performed at: 10/18/2023 4:20 AM  Block Placement  Patient position: sitting  Prep: ChloraPrep  Sterility prep: cap, gloves and mask  Sedation level: no sedation  Patient monitoring: blood pressure, continuous pulse oximetry and heart rate  Approach: midline  Local numbing: lidocaine 1% to skin and subcutaneous tissues  Vertebral space: lumbar  Lumbar location: L3-L4  Epidural  Loss of resistance technique: air  Guidance: landmark technique        Needle  Needle type: Tuohy   Needle gauge: 17  Needle length: 8.9cm  Needle insertion depth: 8 cm  Catheter type: multi-orifice  Catheter size: 19 G  Catheter at skin depth: 14 cm  Catheter securement method: clear occlusive dressing    Test dose: lidocaine 1.5% with epinephrine 1-to-200,000  Test dose given at 10/18/2023 4:22 AM  Test dose: lidocaine 1.5% with epinephrine 1-to-200,000  Test dose result: no positive test dose            Assessment bilateral  Block outcome: patient comfortable  Number of attempts: 1  Events: no positive test dose  Procedure assessment: patient tolerated procedure well with no immediate complications  Additional Notes   immediately after epidural taped.

## 2023-10-21 NOTE — ANESTHESIA POSTPROCEDURE EVALUATION
Patient: Una Alfaro    Procedure Summary       Date: 10/18/23 Room / Location:     Anesthesia Start: 0554 Anesthesia Stop:     Procedure: Labor Analgesia Diagnosis:     Scheduled Providers:  Responsible Provider: WALT Paiz    Anesthesia Type: epidural ASA Status: 2            Anesthesia Type: epidural    Vitals Value Taken Time   BP wnl 10/21/23 0843   Temp wnl 10/21/23 0843   Pulse wnl 10/21/23 0843   Resp wnl 10/21/23 0843   SpO2 wnl 10/21/23 0843       Anesthesia Post Evaluation    Patient location during evaluation: bedside  Comments: Full recovery from epidural expected.  Pt dosed only with initial 10cc        There were no known notable events for this encounter.     No

## 2023-10-25 ENCOUNTER — TELEPHONE (OUTPATIENT)
Dept: OBSTETRICS AND GYNECOLOGY | Facility: HOSPITAL | Age: 33
End: 2023-10-25
Payer: COMMERCIAL

## 2023-10-25 NOTE — PROGRESS NOTES
Follow-Up Note    Care Type: Women's Health  Phone Number Called: [phone]    Call Outcome (connected, left message, no answer, phone disconnected): Left Message.   Patient reports feeling symptoms are (better, worse, same):     After returning home from the hospital, was it clear to you:     Which Meds were new Meds?   Which Meds to continue?   Which Meds to stop?   Who participated in medication reconciliation with the hospital staff (patient, family, other, no one):     To be answered by care coordinator or staff member doing call back:     In your professional opinion, do you think there was a medication discrepancy or potential for medication discrepancy in this situation?     Medication issues (none, confusion which medications to take, non-adherence to medication, prescription unfilled-inadequate funds, prescription unfilled-pharmacy will not fill (prescription unfilled-unable to get to pharmacy, troubled by side effects, other-see comments):     Discharge Instructions were clear:    Patient has a primary care provider:   Post-hospital follow-up occurred according to schedule:   Reason (appointment scheduled in future, appointment was never scheduled-encouraged patient to call, no appointment available within discharge plan, patient missed appointment):     Delivered baby(ies):     Chest Pain?:  SOB or difficulty breathing?:   Seizures?:    Any thoughts of hurting yourself or your baby?:  Bleeding that is soaking through one pad/hour or blood clots the size of an egg or larger?:  Incision that is not healing?    Red or swollen leg that is painful or warm to the touch?   Temperature of 100.4*F or higher?:  Headache that does not get better, even after taking medicine, or a bad headache with vision changes?:    Where or in what is your baby sleeping?  ABC's of sleep covered?     How are you feeding your baby(ies)-breast, EBM, formula, supplementation?:   Baby getting anything other than breastmilk or  formula for food?    Patient has Primary Care Provider for baby(ies)?   Baby has been seen by a health care provider since discharge?  If no, reason (appointment scheduled in the future, appointment was never scheduled, no appointment available within discharge plan, patient missed appointment):     Mom's Discharge Date: 10/19/2023  Date Baby was seen by provider:    Patient has specific name and number of who to call for concerns?:     Date/Time of Call: 10/25/2023 @ 1503  Call back done by (care coordinator, relationship based nurse, patient returned call, , lactation consultant, manager/supervisor, patient navigator, social work, other-see comments): lactation consultant    Comments:               Attempt Date 1: 10/25/2023  Call Attempt 1 outcome: Left Message.     Attempt Date 2:   Call Attempt 2 outcome:

## 2023-10-30 NOTE — PROGRESS NOTES
Follow-Up Note    Care Type: Women's Health  Phone Number Called: [phone]    Call Outcome (connected, left message, no answer, phone disconnected): Left Message.   Patient reports feeling symptoms are (better, worse, same):     After returning home from the hospital, was it clear to you:     Which Meds were new Meds?   Which Meds to continue?   Which Meds to stop?   Who participated in medication reconciliation with the hospital staff (patient, family, other, no one):     To be answered by care coordinator or staff member doing call back:     In your professional opinion, do you think there was a medication discrepancy or potential for medication discrepancy in this situation?     Medication issues (none, confusion which medications to take, non-adherence to medication, prescription unfilled-inadequate funds, prescription unfilled-pharmacy will not fill (prescription unfilled-unable to get to pharmacy, troubled by side effects, other-see comments):     Discharge Instructions were clear:    Patient has a primary care provider:   Post-hospital follow-up occurred according to schedule:   Reason (appointment scheduled in future, appointment was never scheduled-encouraged patient to call, no appointment available within discharge plan, patient missed appointment):     Delivered baby(ies):     Chest Pain?:  SOB or difficulty breathing?:   Seizures?:    Any thoughts of hurting yourself or your baby?:  Bleeding that is soaking through one pad/hour or blood clots the size of an egg or larger?:  Incision that is not healing?    Red or swollen leg that is painful or warm to the touch?   Temperature of 100.4*F or higher?:  Headache that does not get better, even after taking medicine, or a bad headache with vision changes?:    Where or in what is your baby sleeping?  ABC's of sleep covered?     How are you feeding your baby(ies)-breast, EBM, formula, supplementation?:   Baby getting anything other than breastmilk or  formula for food?    Patient has Primary Care Provider for baby(ies)?   Baby has been seen by a health care provider since discharge?  If no, reason (appointment scheduled in the future, appointment was never scheduled, no appointment available within discharge plan, patient missed appointment):     Mom's Discharge Date: 10/19/2023  Date Baby was seen by provider:    Patient has specific name and number of who to call for concerns?:     Date/Time of Call: 10/25/2023 @ 1503  Call back done by (care coordinator, relationship based nurse, patient returned call, , lactation consultant, manager/supervisor, patient navigator, social work, other-see comments): lactation consultant    Comments:               Attempt Date 1: 10/25/2023  Call Attempt 1 outcome: Left Message.     Attempt Date 2: 10/30/23  Call Attempt 2 outcome: Left message

## 2023-11-20 ENCOUNTER — HOSPITAL ENCOUNTER (EMERGENCY)
Facility: HOSPITAL | Age: 33
Discharge: HOME | End: 2023-11-20
Payer: COMMERCIAL

## 2023-11-20 VITALS
SYSTOLIC BLOOD PRESSURE: 122 MMHG | TEMPERATURE: 97 F | BODY MASS INDEX: 37.56 KG/M2 | OXYGEN SATURATION: 94 % | WEIGHT: 220 LBS | HEART RATE: 81 BPM | DIASTOLIC BLOOD PRESSURE: 78 MMHG | RESPIRATION RATE: 18 BRPM | HEIGHT: 64 IN

## 2023-11-20 DIAGNOSIS — L03.012 PARONYCHIA OF FINGER OF LEFT HAND: Primary | ICD-10-CM

## 2023-11-20 PROCEDURE — 99285 EMERGENCY DEPT VISIT HI MDM: CPT | Mod: 25

## 2023-11-20 PROCEDURE — 26010 DRAINAGE OF FINGER ABSCESS: CPT | Mod: F3

## 2023-11-20 PROCEDURE — 10060 I&D ABSCESS SIMPLE/SINGLE: CPT

## 2023-11-20 PROCEDURE — 26010 DRAINAGE OF FINGER ABSCESS: CPT | Performed by: HEALTH CARE PROVIDER

## 2023-11-20 PROCEDURE — 99282 EMERGENCY DEPT VISIT SF MDM: CPT | Mod: 25

## 2023-11-20 RX ORDER — LIDOCAINE HYDROCHLORIDE 10 MG/ML
20 INJECTION, SOLUTION EPIDURAL; INFILTRATION; INTRACAUDAL; PERINEURAL ONCE
Status: DISCONTINUED | OUTPATIENT
Start: 2023-11-20 | End: 2023-11-20 | Stop reason: HOSPADM

## 2023-11-20 RX ORDER — LIDOCAINE HYDROCHLORIDE 10 MG/ML
INJECTION INFILTRATION; PERINEURAL
Status: DISCONTINUED
Start: 2023-11-20 | End: 2023-11-20 | Stop reason: HOSPADM

## 2023-11-20 ASSESSMENT — PAIN DESCRIPTION - LOCATION: LOCATION: FINGER (COMMENT WHICH ONE)

## 2023-11-20 ASSESSMENT — COLUMBIA-SUICIDE SEVERITY RATING SCALE - C-SSRS
1. IN THE PAST MONTH, HAVE YOU WISHED YOU WERE DEAD OR WISHED YOU COULD GO TO SLEEP AND NOT WAKE UP?: NO
6. HAVE YOU EVER DONE ANYTHING, STARTED TO DO ANYTHING, OR PREPARED TO DO ANYTHING TO END YOUR LIFE?: NO
2. HAVE YOU ACTUALLY HAD ANY THOUGHTS OF KILLING YOURSELF?: NO

## 2023-11-20 ASSESSMENT — PAIN SCALES - GENERAL: PAINLEVEL_OUTOF10: 8

## 2023-11-20 ASSESSMENT — PAIN DESCRIPTION - ORIENTATION: ORIENTATION: LEFT

## 2023-11-20 ASSESSMENT — PAIN - FUNCTIONAL ASSESSMENT: PAIN_FUNCTIONAL_ASSESSMENT: 0-10

## 2023-11-20 NOTE — ED PROVIDER NOTES
HPI   Chief Complaint   Patient presents with    Hand Pain     Left index finger infection for 4 days,  On ABX for 3 days.  Had a red streak going up her hand  this morning and was very concerned.       CC: Left ring finger infection  HPI:   33-year-old female presents ED with infection to the distal left ring finger for the past 4 days.  Patient reports 48 hours ago she went to urgent care she was placed on Augmentin, she reported noticing some red streaking this morning however that appears it has resolved she notes swelling has somewhat decreased however , red, patient reported symptoms started shortly after she pulled a hangnail.  Patient is right-hand dominant she notes tetanus is up-to-date, she is also currently breast-feeding.    Additional Limitations to History:   External Records Reviewed: I reviewed recent and relevant outside records including   History Obtained From:     Past Medical History: Per HPI  Medications: Reviewed in EMR and with patient  Allergies:  Reviewed in EMR  Past Surgical History:   Social History:     ------------------------------------------------------------------------------------------------------  Physical Exam:  --Vital signs reviewed in nursing triage note, EMR flow sheets, and at patient's bedside  GEN:  A&Ox3, no acute distress, appears comfortable.  Conversational and appropriate.  No confusion or gross mental status changes.  EYES: EOMI, non-injected sclera.  ENT: Moist mucous membranes, no apparent injuries or lesions.   CARDIO: Normal rate and regular rhythm. No murmurs, rubs, or gallops.  2+ equal pulses of the distal extremities.   PULM: Clear to auscultation bilaterally. No rales, rhonchi, or wheezes. Good symmetric chest expansion.  GI: Soft, non-tender, non-distended. No rebound tenderness or guarding.  SKIN: Warm and dry, no rashes or lesions.   MSK: ROM intact the extremities without contractures.   EXT: No peripheral edema, contusions, or wounds.    NEURO: Cranial nerves II-XII grossly intact. Sensation to light touch intact and equal bilaterally in upper and lower extremities.  Symmetric 5/5 strength in upper and lower extremities.  PSYCH: Appropriate mood and behavior, converses and responds appropriately during exam.  -------------------------------------------------------------------------------------------------------      Differential Diagnoses Considered:   Chronic Medical Conditions Significantly Affecting Care:   Diagnostic testing considered: [PERC, D-Dimer, PECARN, etc.]    - EKG interpreted by myself (ED attending physician):   - I independently interpreted: [CXR, CT, POCUS, etc. including your interpretation]  - Labs notable for     Escalation of Care: Appropriate for   Social Determinants of Health Significantly Affecting Care: [Homelessness, lacking transportation, uninsured, unable to afford medications]  Prescription Drug Consideration: [Antibiotics, antivirals, pain medications, etc.]  Discussion of Management with Other Providers:  I discussed the patient/results with: [admitting team, consultant, radiologist, social work, EPAT, case management, PT/OT, RT, PCP, etc.]      Supa Kirk PA-C                          No data recorded                Patient History   Past Medical History:   Diagnosis Date    Morbid obesity with BMI of 40.0-44.9, adult (CMS/[facility])      No past surgical history on file.  No family history on file.  Social History     Tobacco Use    Smoking status: Never    Smokeless tobacco: Never   Vaping Use    Vaping Use: Never used   Substance Use Topics    Alcohol use: Not Currently    Drug use: Never       Physical Exam   ED Triage Vitals [11/20/23 1406]   Temp Heart Rate Resp BP   36.1 °C (97 °F) 81 18 122/78      SpO2 Temp Source Heart Rate Source Patient Position   94 % Skin Monitor Sitting      BP Location FiO2 (%)     Right leg --       Physical Exam  Musculoskeletal:        Hands:       Comments: Distal left nail fold  is mildly erythematous, edematous, tender, patient is neurovascular intact distally, no obvious drainage no ascending lymphangitis noted.          ED Course & MDM   Diagnoses as of 11/22/23 2011   Paronychia of finger of left hand       Medical Decision Making  33-year-old female with paronychia to the left index finger, incision and drainage completed, patient is already taking Augmentin, advised patient to finish antibiotics, soap and water, return to ED if symptoms continue or infection recurs.  Low suspicion for felon digit is neurovascular intact distally.        Procedure  Incision and Drainage    Performed by: Supa Kirk PA-C  Authorized by: Supa Kirk PA-C    Consent:     Consent obtained:  Verbal    Consent given by:  Patient    Risks, benefits, and alternatives were discussed: yes      Risks discussed:  Infection, pain and incomplete drainage    Alternatives discussed:  Observation  [place] protocol:     Patient identity confirmed:  Verbally with patient  Location:     Type:  Abscess    Size:  2cm    Location:  Upper extremity    Upper extremity location:  Finger    Finger location:  L ring finger  Pre-procedure details:     Skin preparation:  Chlorhexidine  Sedation:     Sedation type:  None  Anesthesia:     Anesthesia method:  Local infiltration and nerve block    Block needle gauge:  25 G    Block anesthetic:  Lidocaine 1% w/o epi    Block injection procedure:  Anatomic landmarks identified    Block outcome:  Anesthesia achieved  Procedure type:     Complexity:  Simple  Post-procedure details:     Procedure completion:  Tolerated       Supa Kirk PA-C  11/22/23 2013

## 2024-01-18 ENCOUNTER — APPOINTMENT (OUTPATIENT)
Dept: PRIMARY CARE | Facility: CLINIC | Age: 34
End: 2024-01-18
Payer: COMMERCIAL

## 2024-02-21 ENCOUNTER — OFFICE VISIT (OUTPATIENT)
Dept: PRIMARY CARE | Facility: CLINIC | Age: 34
End: 2024-02-21
Payer: COMMERCIAL

## 2024-02-21 VITALS
OXYGEN SATURATION: 97 % | SYSTOLIC BLOOD PRESSURE: 108 MMHG | WEIGHT: 226 LBS | HEIGHT: 64 IN | DIASTOLIC BLOOD PRESSURE: 68 MMHG | HEART RATE: 87 BPM | BODY MASS INDEX: 38.58 KG/M2

## 2024-02-21 DIAGNOSIS — L81.9 ATYPICAL PIGMENTED SKIN LESION: ICD-10-CM

## 2024-02-21 DIAGNOSIS — E28.2 POLYCYSTIC OVARIAN SYNDROME: Chronic | ICD-10-CM

## 2024-02-21 DIAGNOSIS — Z00.00 HEALTH CARE MAINTENANCE: Primary | ICD-10-CM

## 2024-02-21 PROCEDURE — 99385 PREV VISIT NEW AGE 18-39: CPT | Performed by: NURSE PRACTITIONER

## 2024-02-21 PROCEDURE — 1036F TOBACCO NON-USER: CPT | Performed by: NURSE PRACTITIONER

## 2024-02-21 RX ORDER — CALCIUM CARBONATE 500(1250)
1 TABLET,CHEWABLE ORAL DAILY
COMMUNITY

## 2024-02-21 ASSESSMENT — ENCOUNTER SYMPTOMS
NAUSEA: 0
COUGH: 0
NUMBNESS: 0
ABDOMINAL PAIN: 0
FEVER: 0
SHORTNESS OF BREATH: 0
VOMITING: 0
FATIGUE: 0
WEAKNESS: 0
PSYCHIATRIC NEGATIVE: 1
PALPITATIONS: 0
CHILLS: 0
DIARRHEA: 0
SORE THROAT: 0
MUSCULOSKELETAL NEGATIVE: 1
DIZZINESS: 0
HEADACHES: 0
CONSTIPATION: 0

## 2024-02-21 ASSESSMENT — PATIENT HEALTH QUESTIONNAIRE - PHQ9
2. FEELING DOWN, DEPRESSED OR HOPELESS: NOT AT ALL
1. LITTLE INTEREST OR PLEASURE IN DOING THINGS: NOT AT ALL
SUM OF ALL RESPONSES TO PHQ9 QUESTIONS 1 AND 2: 0

## 2024-02-21 NOTE — PROGRESS NOTES
"Subjective   Patient ID: Una Alfaro is a 34 y.o. female who presents to establish relationship with primary care provider.       HPI   . 3 kids.  Part time work from home.  Nonsmoker, social drinker, no drug use.  No formal exercise.  Denies chest pain, SOB, palpitations, dizziness,  or GI issues.  Takes no rx medications.  Has some small discolorations on face she would like to see derm for.  Has had them frozen off in the past.  Returned.    Review of Systems   Constitutional:  Negative for chills, fatigue and fever.   HENT:  Negative for congestion, ear pain and sore throat.    Eyes:  Negative for visual disturbance.   Respiratory:  Negative for cough and shortness of breath.    Cardiovascular:  Negative for chest pain, palpitations and leg swelling.   Gastrointestinal:  Negative for abdominal pain, constipation, diarrhea, nausea and vomiting.   Genitourinary: Negative.    Musculoskeletal: Negative.    Skin:  Negative for rash.   Neurological:  Negative for dizziness, weakness, numbness and headaches.   Psychiatric/Behavioral: Negative.         Objective   /68   Pulse 87   Ht 1.626 m (5' 4\")   Wt 103 kg (226 lb)   SpO2 97%   BMI 38.79 kg/m²     Physical Exam  Constitutional:       General: She is not in acute distress.     Appearance: Normal appearance.   HENT:      Head: Normocephalic and atraumatic.      Right Ear: Tympanic membrane, ear canal and external ear normal.      Left Ear: Tympanic membrane, ear canal and external ear normal.      Nose: Nose normal.      Mouth/Throat:      Mouth: Mucous membranes are moist.      Pharynx: Oropharynx is clear.   Eyes:      Extraocular Movements: Extraocular movements intact.      Conjunctiva/sclera: Conjunctivae normal.      Pupils: Pupils are equal, round, and reactive to light.   Cardiovascular:      Rate and Rhythm: Normal rate and regular rhythm.      Pulses: Normal pulses.      Heart sounds: Normal heart sounds. No murmur " heard.  Pulmonary:      Effort: Pulmonary effort is normal.      Breath sounds: Normal breath sounds. No wheezing, rhonchi or rales.   Abdominal:      General: Bowel sounds are normal.      Palpations: Abdomen is soft.      Tenderness: There is no abdominal tenderness.   Musculoskeletal:         General: Normal range of motion.      Cervical back: Normal range of motion and neck supple.   Lymphadenopathy:      Comments: No lymphadenopathy noted   Skin:     General: Skin is warm and dry.      Findings: No rash.   Neurological:      General: No focal deficit present.      Mental Status: She is alert and oriented to person, place, and time.      Cranial Nerves: No cranial nerve deficit.      Coordination: Coordination normal.      Gait: Gait normal.   Psychiatric:         Mood and Affect: Mood normal.         Behavior: Behavior normal.         Assessment/Plan   Problem List Items Addressed This Visit             ICD-10-CM    Polycystic ovarian syndrome (Chronic) E28.2    Health care maintenance - Primary Z00.00    Relevant Orders    Comprehensive Metabolic Panel    TSH with reflex to Free T4 if abnormal    Lipid Panel    Vitamin D 25-Hydroxy,Total (for eval of Vitamin D levels)    CBC and Auto Differential     Other Visit Diagnoses         Codes    Atypical pigmented skin lesion     L81.9    Relevant Orders    Referral to Dermatology        Follow up in 1 year or sooner if needed

## 2024-04-23 ENCOUNTER — OFFICE VISIT (OUTPATIENT)
Dept: PRIMARY CARE | Facility: CLINIC | Age: 34
End: 2024-04-23
Payer: COMMERCIAL

## 2024-04-23 VITALS
DIASTOLIC BLOOD PRESSURE: 67 MMHG | OXYGEN SATURATION: 97 % | HEIGHT: 64 IN | WEIGHT: 234 LBS | HEART RATE: 81 BPM | SYSTOLIC BLOOD PRESSURE: 106 MMHG | BODY MASS INDEX: 39.95 KG/M2

## 2024-04-23 DIAGNOSIS — J02.0 PHARYNGITIS DUE TO STREPTOCOCCUS SPECIES: ICD-10-CM

## 2024-04-23 LAB — POC RAPID STREP: NEGATIVE

## 2024-04-23 PROCEDURE — 87880 STREP A ASSAY W/OPTIC: CPT | Performed by: NURSE PRACTITIONER

## 2024-04-23 PROCEDURE — 1036F TOBACCO NON-USER: CPT | Performed by: NURSE PRACTITIONER

## 2024-04-23 PROCEDURE — 99213 OFFICE O/P EST LOW 20 MIN: CPT | Performed by: NURSE PRACTITIONER

## 2024-04-23 RX ORDER — AMOXICILLIN AND CLAVULANATE POTASSIUM 875; 125 MG/1; MG/1
875 TABLET, FILM COATED ORAL 2 TIMES DAILY
Qty: 20 TABLET | Refills: 0 | Status: SHIPPED | OUTPATIENT
Start: 2024-04-23 | End: 2024-05-03

## 2024-04-23 ASSESSMENT — ENCOUNTER SYMPTOMS
COUGH: 0
HEADACHES: 0
SHORTNESS OF BREATH: 0
SWOLLEN GLANDS: 0
VOMITING: 0
SORE THROAT: 1
ABDOMINAL PAIN: 0
HOARSE VOICE: 1
DIARRHEA: 0
STRIDOR: 0
NECK PAIN: 0
TROUBLE SWALLOWING: 1

## 2024-04-23 ASSESSMENT — PATIENT HEALTH QUESTIONNAIRE - PHQ9
2. FEELING DOWN, DEPRESSED OR HOPELESS: NOT AT ALL
SUM OF ALL RESPONSES TO PHQ9 QUESTIONS 1 AND 2: 0
1. LITTLE INTEREST OR PLEASURE IN DOING THINGS: NOT AT ALL

## 2024-04-23 NOTE — PROGRESS NOTES
"Subjective   Patient ID: Una Alfaro is a 34 y.o. female who presents for Sore Throat.    Sore Throat   This is a new problem. The current episode started 1 to 4 weeks ago. The problem has been waxing and waning. The pain is worse on the left side. There has been no fever. The pain is at a severity of 7/10. Associated symptoms include congestion, a hoarse voice and trouble swallowing. Pertinent negatives include no abdominal pain, coughing, diarrhea, drooling, ear discharge, ear pain, headaches, plugged ear sensation, neck pain, shortness of breath, stridor, swollen glands or vomiting. She has had exposure to strep. She has had no exposure to mono.      Was treated for sore throat several weeks ago.  Was tested for strep and it was positive did 10-day course of antibiotic.  It did get better for short time.  Kids are all sick at home nobody has strep however she has become sick again with a sore throat since last Monday.  Sore throat keeps getting worse to the point where she cannot swallow.  Denies fever, nausea vomiting.    Review of Systems   HENT:  Positive for congestion, hoarse voice, sore throat and trouble swallowing. Negative for drooling, ear discharge and ear pain.    Respiratory:  Negative for cough, shortness of breath and stridor.    Gastrointestinal:  Negative for abdominal pain, diarrhea and vomiting.   Musculoskeletal:  Negative for neck pain.   Neurological:  Negative for headaches.       Objective   /67   Pulse 81   Ht 1.626 m (5' 4\")   Wt 106 kg (234 lb)   SpO2 97%   BMI 40.17 kg/m²     Physical Exam  Constitutional:       General: She is not in acute distress.     Appearance: Normal appearance.   HENT:      Head: Normocephalic and atraumatic.      Right Ear: Tympanic membrane, ear canal and external ear normal.      Left Ear: Tympanic membrane, ear canal and external ear normal.      Nose: Nose normal.      Mouth/Throat:      Mouth: Mucous membranes are moist.      Pharynx: " Pharyngeal swelling, oropharyngeal exudate and posterior oropharyngeal erythema present.   Eyes:      Extraocular Movements: Extraocular movements intact.      Conjunctiva/sclera: Conjunctivae normal.      Pupils: Pupils are equal, round, and reactive to light.   Cardiovascular:      Rate and Rhythm: Normal rate and regular rhythm.      Pulses: Normal pulses.      Heart sounds: Normal heart sounds. No murmur heard.  Pulmonary:      Effort: Pulmonary effort is normal.      Breath sounds: Normal breath sounds. No wheezing, rhonchi or rales.   Abdominal:      General: Bowel sounds are normal.      Palpations: Abdomen is soft.      Tenderness: There is no abdominal tenderness.   Musculoskeletal:         General: Normal range of motion.      Cervical back: Normal range of motion and neck supple.   Lymphadenopathy:      Comments: No lymphadenopathy noted   Skin:     General: Skin is warm and dry.      Findings: No rash.   Neurological:      General: No focal deficit present.      Mental Status: She is alert and oriented to person, place, and time.      Cranial Nerves: No cranial nerve deficit.      Coordination: Coordination normal.      Gait: Gait normal.   Psychiatric:         Mood and Affect: Mood normal.         Behavior: Behavior normal.         Assessment/Plan   Problem List Items Addressed This Visit             ICD-10-CM    Pharyngitis due to Streptococcus species J02.0    Relevant Medications    amoxicillin-pot clavulanate (Augmentin) 875-125 mg tablet    Other Relevant Orders    POCT Rapid Strep A manually resulted     Start Augmentin 875/125 mg twice daily for 10 days  Follow up if symptoms worsen or do not improve with treatment.

## 2024-07-17 ENCOUNTER — LAB (OUTPATIENT)
Dept: LAB | Facility: LAB | Age: 34
End: 2024-07-17
Payer: COMMERCIAL

## 2024-07-17 DIAGNOSIS — Z00.00 HEALTH CARE MAINTENANCE: ICD-10-CM

## 2024-07-17 LAB
ALBUMIN SERPL BCP-MCNC: 4.4 G/DL (ref 3.4–5)
ALP SERPL-CCNC: 59 U/L (ref 33–110)
ALT SERPL W P-5'-P-CCNC: 19 U/L (ref 7–45)
ANION GAP SERPL CALC-SCNC: 14 MMOL/L (ref 10–20)
AST SERPL W P-5'-P-CCNC: 19 U/L (ref 9–39)
BASOPHILS # BLD AUTO: 0.03 X10*3/UL (ref 0–0.1)
BASOPHILS NFR BLD AUTO: 0.4 %
BILIRUB SERPL-MCNC: 0.5 MG/DL (ref 0–1.2)
BUN SERPL-MCNC: 14 MG/DL (ref 6–23)
CALCIUM SERPL-MCNC: 8.9 MG/DL (ref 8.6–10.3)
CHLORIDE SERPL-SCNC: 103 MMOL/L (ref 98–107)
CHOLEST SERPL-MCNC: 164 MG/DL (ref 0–199)
CHOLESTEROL/HDL RATIO: 4.1
CO2 SERPL-SCNC: 27 MMOL/L (ref 21–32)
CREAT SERPL-MCNC: 0.87 MG/DL (ref 0.5–1.05)
EGFRCR SERPLBLD CKD-EPI 2021: 89 ML/MIN/1.73M*2
EOSINOPHIL # BLD AUTO: 0.15 X10*3/UL (ref 0–0.7)
EOSINOPHIL NFR BLD AUTO: 2 %
ERYTHROCYTE [DISTWIDTH] IN BLOOD BY AUTOMATED COUNT: 13 % (ref 11.5–14.5)
GLUCOSE SERPL-MCNC: 70 MG/DL (ref 74–99)
HCT VFR BLD AUTO: 39.6 % (ref 36–46)
HDLC SERPL-MCNC: 39.9 MG/DL
HGB BLD-MCNC: 13.1 G/DL (ref 12–16)
IMM GRANULOCYTES # BLD AUTO: 0.02 X10*3/UL (ref 0–0.7)
IMM GRANULOCYTES NFR BLD AUTO: 0.3 % (ref 0–0.9)
LDLC SERPL CALC-MCNC: 114 MG/DL
LYMPHOCYTES # BLD AUTO: 2.24 X10*3/UL (ref 1.2–4.8)
LYMPHOCYTES NFR BLD AUTO: 29.3 %
MCH RBC QN AUTO: 29.6 PG (ref 26–34)
MCHC RBC AUTO-ENTMCNC: 33.1 G/DL (ref 32–36)
MCV RBC AUTO: 89 FL (ref 80–100)
MONOCYTES # BLD AUTO: 0.61 X10*3/UL (ref 0.1–1)
MONOCYTES NFR BLD AUTO: 8 %
NEUTROPHILS # BLD AUTO: 4.6 X10*3/UL (ref 1.2–7.7)
NEUTROPHILS NFR BLD AUTO: 60 %
NON HDL CHOLESTEROL: 124 MG/DL (ref 0–149)
NRBC BLD-RTO: 0 /100 WBCS (ref 0–0)
PLATELET # BLD AUTO: 224 X10*3/UL (ref 150–450)
POTASSIUM SERPL-SCNC: 4.2 MMOL/L (ref 3.5–5.3)
PROT SERPL-MCNC: 7.1 G/DL (ref 6.4–8.2)
RBC # BLD AUTO: 4.43 X10*6/UL (ref 4–5.2)
SODIUM SERPL-SCNC: 140 MMOL/L (ref 136–145)
TRIGL SERPL-MCNC: 49 MG/DL (ref 0–149)
TSH SERPL-ACNC: 2.21 MIU/L (ref 0.44–3.98)
VLDL: 10 MG/DL (ref 0–40)
WBC # BLD AUTO: 7.7 X10*3/UL (ref 4.4–11.3)

## 2024-07-17 PROCEDURE — 85025 COMPLETE CBC W/AUTO DIFF WBC: CPT

## 2024-07-17 PROCEDURE — 84443 ASSAY THYROID STIM HORMONE: CPT

## 2024-07-17 PROCEDURE — 80053 COMPREHEN METABOLIC PANEL: CPT

## 2024-07-17 PROCEDURE — 36415 COLL VENOUS BLD VENIPUNCTURE: CPT

## 2024-07-17 PROCEDURE — 80061 LIPID PANEL: CPT

## 2024-07-17 PROCEDURE — 82306 VITAMIN D 25 HYDROXY: CPT

## 2024-07-18 LAB — 25(OH)D3 SERPL-MCNC: 44 NG/ML (ref 30–100)

## 2025-01-13 ENCOUNTER — LAB REQUISITION (OUTPATIENT)
Dept: LAB | Facility: HOSPITAL | Age: 35
End: 2025-01-13
Payer: COMMERCIAL

## 2025-01-13 DIAGNOSIS — N39.0 URINARY TRACT INFECTION, SITE NOT SPECIFIED: ICD-10-CM

## 2025-01-13 PROCEDURE — 87186 SC STD MICRODIL/AGAR DIL: CPT

## 2025-01-13 PROCEDURE — 87086 URINE CULTURE/COLONY COUNT: CPT

## 2025-01-16 LAB — BACTERIA UR CULT: ABNORMAL

## 2025-02-24 ENCOUNTER — APPOINTMENT (OUTPATIENT)
Dept: PRIMARY CARE | Facility: CLINIC | Age: 35
End: 2025-02-24
Payer: COMMERCIAL